# Patient Record
Sex: FEMALE | Race: WHITE | Employment: FULL TIME | URBAN - METROPOLITAN AREA
[De-identification: names, ages, dates, MRNs, and addresses within clinical notes are randomized per-mention and may not be internally consistent; named-entity substitution may affect disease eponyms.]

---

## 2020-03-17 ENCOUNTER — TELEPHONE (OUTPATIENT)
Dept: ORTHOPEDICS | Facility: CLINIC | Age: 60
End: 2020-03-17

## 2020-03-17 NOTE — TELEPHONE ENCOUNTER
Returned call to patient, apologizing and informing her that our office is not scheduling appointments for at least 4 weeks (subject to change based on state of COVID-19). She verbalized understanding and was completely understanding. She chose to not schedule at this time.

## 2020-03-17 NOTE — TELEPHONE ENCOUNTER
Reason for Call:  Other appointment    Detailed comments: Pt is wondering if Dr. Stack would be able to do a cortisone injection on her lower L heel on the side, and tendon sheath.    States it is due to arthritis and reconstructive surgery she had 30 years ago. Usually goes to [facility] but they canceled her appt. Please call her back if we are able to schedule. Would come at any time. Thank you!    Phone Number Patient can be reached at: Cell number on file:    Telephone Information:   Mobile [phone]       Best Time: any    Can we leave a detailed message on this number? YES    Call taken on 3/17/2020 at 1:26 PM by Karley Beltran

## 2020-03-17 NOTE — TELEPHONE ENCOUNTER
[facility] Call Center    Phone Message    May a detailed message be left on voicemail: yes     Reason for Call: Other: [facility], pt was a pt at [facility]. Would like appt before 3/31/20.     Action Taken: Message routed to:  [facility] ([facility] Sports Med     Travel Screening: Not Applicable

## 2020-03-18 NOTE — TELEPHONE ENCOUNTER
LVM for patient with the below information. Left triage number if she has any questions.    Nalini Martell ATC